# Patient Record
Sex: FEMALE | Race: WHITE | Employment: FULL TIME | URBAN - METROPOLITAN AREA
[De-identification: names, ages, dates, MRNs, and addresses within clinical notes are randomized per-mention and may not be internally consistent; named-entity substitution may affect disease eponyms.]

---

## 2020-07-27 ENCOUNTER — THERAPY VISIT (OUTPATIENT)
Dept: CHIROPRACTIC MEDICINE | Facility: CLINIC | Age: 35
End: 2020-07-27
Payer: COMMERCIAL

## 2020-07-27 DIAGNOSIS — M99.01 CERVICAL SEGMENT DYSFUNCTION: ICD-10-CM

## 2020-07-27 DIAGNOSIS — M99.05 SEGMENTAL DYSFUNCTION OF PELVIC REGION: Primary | ICD-10-CM

## 2020-07-27 DIAGNOSIS — M62.838 SPASM OF MUSCLE: ICD-10-CM

## 2020-07-27 DIAGNOSIS — M54.2 CERVICALGIA: ICD-10-CM

## 2020-07-27 DIAGNOSIS — M99.02 THORACIC SEGMENT DYSFUNCTION: ICD-10-CM

## 2020-07-27 DIAGNOSIS — M99.03 SEGMENTAL DYSFUNCTION OF LUMBAR REGION: ICD-10-CM

## 2020-07-27 DIAGNOSIS — M54.50 LUMBAGO: ICD-10-CM

## 2020-07-27 PROCEDURE — 99203 OFFICE O/P NEW LOW 30 MIN: CPT | Mod: 25 | Performed by: CHIROPRACTOR

## 2020-07-27 PROCEDURE — 98941 CHIROPRACT MANJ 3-4 REGIONS: CPT | Mod: AT | Performed by: CHIROPRACTOR

## 2020-07-27 NOTE — PROGRESS NOTES
Chiropractic Clinic Visit    PCP: No Ref-Primary, Physician    Radha Kam is a 35 year old female who is seen  as a self referral presenting with low back and neck pain . Patient reports that the onset was 6-7 years ago after lifting weights.  Radha was lifiting heavy weaight and over time started to feel some pain/stiffness in her low back and upper trap region on the right.  These symptoms have come and go over the past few years.  She currently rates her symptoms at a 4 out of 10 and describes them as a dull achy pain with periods of sharpness depending on movement/position.  There are no radiating symptoms and her sleep is interrupted at times.     Injury: Overuse    Location of Pain: lower cervical and lower lumabr  at the following level(s) C6 , C7 , T1 , T2 , L4  and L5   Duration of Pain: 6-7  year(s)  Rating of Pain at worst: 7/10  Rating of Pain Currently: 4/10  Symptoms are better with: Rest  Symptoms are worse with: sitting and sleeping  Additional Features:      Other evaluation and/or treatments so far consists of:     Health History  as reported by the patient:    How does the patient rate their own health:   Good    Current or past medical history:   Migraines/headaches    Medical allergies  None    Past Traumas/Surgeries  Other:  [place] teeth extraction    Family History  The family history is not on file.    Medications:  None    Occupation:  teacher    Primary job tasks:   Computer work    Barriers as home/work:   none    Additional health Issues:                   Review of Systems  Musculoskeletal: as above  Remainder of review of systems is negative including constitutional, CV, pulmonary, GI, Skin and Neurologic except as noted in HPI or medical history.    No past medical history on file.  No past surgical history on file.    Objective  There were no vitals taken for this visit.    GENERAL APPEARANCE: healthy, alert and no distress   GAIT: NORMAL  SKIN: no suspicious lesions or  "rashes  NEURO: Normal strength and tone, mentation intact and speech normal  PSYCH:  mentation appears normal and affect normal/bright      Cervical Spine Exam    Range of Motion:         forward flexion mild limitation with pulling         extension moderate limitation         lateral rotation moderate limitation with some pain on right        lateral flexion moderate limitation with some pulling    Inspection:         Anterior head position  Forward rounded shoulders  Flattened thoracic kyphosis    Tender:        upper border of trapezius    Non-Tender:        remainder of cervical spine area    Muscle strength:       C4 (shoulder shrug)  symmetric 5/5 Normal       C5 (shoulder abduction) symmetric 5/5 Normal       C6 (elbow flexion) symmetric 5/5 Normal       C7 (elbow extension) symmetric 5/5 Normal       C8 (finger abduction, thumb flexion) symmetric 5/5 Normal    Sensation:       grossly intact througout bilateral upper extremities    Special Tests:       neg (-) Spurling    Lymphatics:        no edema noted in the upper extremities       Lumbar exam:    Inspection:  \"     no visible deformity in the low back       normal skin\",    ROM:       full flexion       limited extension due to pain    Tender:       paraspinal muscles    Non Tender:       remainder of lumbar spine    Strength:       hip flexion 5/5 Normal       knee extension 5/5 Normal       ankle dorsiflexion 5/5 Normal       ankle plantarflexion 5/5 Normal       dorsiflexion of the great toe 5/5 Normal    Reflexes:       patellar (L3, L4) 2 bilaterally        Sensation:      grossly intact throughout lower extremities    Special tests:  SLR - Right negative and Left negative, Fabere - Right negative and Left negative, Yeoman's - Right negative and Left negative, Nu - Right negative and Left negative and Ely's - Right negative and Left negative    Segmental spinal dysfunction/restrictions found at:  C6 , C7 , T1 , T2 , T7 , T8 , T9 , L4 , L5  and " PSIS Right     The following soft tissue hypotonicities were observed:  Piriformis: right, ache, dull pain and stiff, referred pain: no  Traps: bilateral, ache, dull pain and stiff, referred pain: no    Trigger points were found in:  Piriformis and Traps    Muscle spasm found in:  Piriformis and Traps      Radiology:      Assessment:    1. Segmental dysfunction of pelvic region    2. Lumbago    3. Segmental dysfunction of lumbar region    4. Spasm of muscle    5. Thoracic segment dysfunction    6. Cervicalgia    7. Cervical segment dysfunction        RX ordered/plan of care  Anticipated outcomes  Possible risks and side effects    After discussing the risk and benefits of care, patient consented to treatment    Prognosis: Good      Patient's condition:  Patient had restrictions pre-manipulation    Treatment effectiveness:  Post manipulation there is better intersegmental movement and Patient claims to feel looser post manipulation      Plan:    Procedures:  Evaluation and Management  01590 Moderate level exam 30 min    CMT:  96754 Chiropractic manipulative treatment 3-4 regions performed   Cervical: Diversified, C6, C7 , Prone  Thoracic: Diversified, T1, T2, T7, T8, T9, Prone  Lumbar: Diversified, L4, L5, Side posture  Pelvis: Drop Table, PSIS Right , Prone    Modalities:  33535: MSTM:  To Piriformis and Traps  for 5 min    Therapeutic procedures:  20904: Therapeutic Exercises  Direct one-on-one treatment to develop flexibilty, range of motion, strength and endurance.   The following were demonstrated and practiced:   Stretches - Reviewed stretches today for a minimum of 5 minutes  Crossed leg piriformis stretch seated  Scapular retraction  Cervical retraction    Response to Treatment  Reduction in symptoms as reported by patient    Treatment plan and goals:  Goals:  SLEEPING: the patient specific goal is to attain his pre-injury status of 8 hours comfortably    Frequency of care  Duration of care is estimated to be  6 weeks, from the initial treatment.  It is estimated that the patient will need a total of 6 visits to resolve this episode.  For the initial therapeutic trial of care, the frequency is recommended at 1 X week, once daily.  A reevaluation would be clinically appropriate in 6 visits, to determine progress and further course of care.    In-Office Treatment  Evaluation  Spinal Chiropractic Manipulative Therapy:    Modalities: MSTM  Therapeutic exercises:  Stretches              Recommendations:    Instructions:  stretch as instructed  and ice 20 minutes every other hour as needed    Follow-up:    Return to care in one week       Disclaimer: This note consists of symbols derived from keyboarding, dictation and/or voice recognition software. As a result, there may be errors in the script that have gone undetected. Please consider this when interpreting information found in this chart.

## 2020-08-04 ENCOUNTER — THERAPY VISIT (OUTPATIENT)
Dept: CHIROPRACTIC MEDICINE | Facility: CLINIC | Age: 35
End: 2020-08-04
Payer: COMMERCIAL

## 2020-08-04 DIAGNOSIS — M99.01 CERVICAL SEGMENT DYSFUNCTION: ICD-10-CM

## 2020-08-04 DIAGNOSIS — M99.02 THORACIC SEGMENT DYSFUNCTION: ICD-10-CM

## 2020-08-04 DIAGNOSIS — M62.838 SPASM OF MUSCLE: ICD-10-CM

## 2020-08-04 DIAGNOSIS — M54.50 LUMBAGO: ICD-10-CM

## 2020-08-04 DIAGNOSIS — M54.2 CERVICALGIA: ICD-10-CM

## 2020-08-04 DIAGNOSIS — M99.05 SEGMENTAL DYSFUNCTION OF PELVIC REGION: Primary | ICD-10-CM

## 2020-08-04 DIAGNOSIS — M99.03 SEGMENTAL DYSFUNCTION OF LUMBAR REGION: ICD-10-CM

## 2020-08-04 PROCEDURE — 98941 CHIROPRACT MANJ 3-4 REGIONS: CPT | Mod: AT | Performed by: CHIROPRACTOR

## 2020-08-04 NOTE — PROGRESS NOTES
Visit #:  2 of 6, based on treatment plan    Subjective:  Radha Kam is a 35 year old female who is seen in f/u up for:        Segmental dysfunction of pelvic region  Lumbago  Segmental dysfunction of lumbar region  Spasm of muscle  Thoracic segment dysfunction  Cervicalgia  Cervical segment dysfunction.     Since last visit on 7/27/2020,  Radha Kam reports the following changes: Pain immediately after last treatment: 4/10 and their pain level today 1/10.  Radha reports that she is feeling better and is having less symptoms.  After last visit she went to [facility].  Despite that long drive her back still felt good and gave her minimal pain.    Area of chief complaint:  Cervical, Thoracic and Lumbar :  Symptoms are graded at 1/10. The quality is described as stiff, achey.  Motion has increased, but is still not normal. Patient feels that they are improved due to a reduction in symptoms.        Objective:  The following was observed:    P: palpatory tendernessLumbar erector spine, Piriformis and Traps     A: static palpation demonstrates intersegmental asymmetry , cervical, thoracic, lumbar, pelvis    R: motion palpation notes restricted motion, C6 , C7 , T1 , T2 , T6 , T7 , T8 , L4 , L5  and PSIS Right     T: The following soft tissue hypotonicities were observed:  Piriformis: right, ache, dull pain and stiff, referred pain: no  T paraspinals: bilateral, stiff, no  Traps: bilateral, ache and stiff, referred pain: no      Assessment:    Segmental spinal dysfunction/restrictions found at:  C6   C7   T6  T7  T8  L4  L5  PSIS Right    Diagnoses:      1. Segmental dysfunction of pelvic region    2. Lumbago    3. Segmental dysfunction of lumbar region    4. Spasm of muscle    5. Thoracic segment dysfunction    6. Cervicalgia    7. Cervical segment dysfunction        Patient's condition:  Patient had restrictions pre-manipulation    Treatment effectiveness:  Post manipulation there is better intersegmental movement  and Patient claims to feel looser post manipulation      Procedures:  CMT:  52891 Chiropractic manipulative treatment 3-4 regions performed   Cervical: Diversified, C6, C7 , Prone  Thoracic: Diversified, T1, T2, T6, T7, T8, Prone  Lumbar: Diversified, L4, L5, Side posture  Pelvis: Drop Table, PSIS Right , Prone    Modalities:  29453: MSTM:  To Lumbar erector spine, Piriformis and T-spine paraspinal  for 5 min    Therapeutic procedures:  None      Prognosis: Good    Progress towards Goals: Patient is making progress towards the goal     Response to Treatment:   Reduction in symptoms as reported by patient      Recommendations:    Instructions:  stretch as instructed     Follow-up:   Return to care in one week

## 2022-11-23 ENCOUNTER — LAB REQUISITION (OUTPATIENT)
Dept: LAB | Facility: CLINIC | Age: 37
End: 2022-11-23

## 2022-11-23 DIAGNOSIS — E66.9 OBESITY, UNSPECIFIED: ICD-10-CM

## 2022-11-23 DIAGNOSIS — Z36.89 ENCOUNTER FOR OTHER SPECIFIED ANTENATAL SCREENING: ICD-10-CM

## 2022-11-23 LAB
BASOPHILS # BLD AUTO: 0.1 10E3/UL (ref 0–0.2)
BASOPHILS NFR BLD AUTO: 1 %
EOSINOPHIL # BLD AUTO: 0.2 10E3/UL (ref 0–0.7)
EOSINOPHIL NFR BLD AUTO: 2 %
ERYTHROCYTE [DISTWIDTH] IN BLOOD BY AUTOMATED COUNT: 12.2 % (ref 10–15)
HBA1C MFR BLD: 5.2 %
HCT VFR BLD AUTO: 42.4 % (ref 35–47)
HGB BLD-MCNC: 14 G/DL (ref 11.7–15.7)
IMM GRANULOCYTES # BLD: 0 10E3/UL
IMM GRANULOCYTES NFR BLD: 0 %
LYMPHOCYTES # BLD AUTO: 2.7 10E3/UL (ref 0.8–5.3)
LYMPHOCYTES NFR BLD AUTO: 28 %
MCH RBC QN AUTO: 30.6 PG (ref 26.5–33)
MCHC RBC AUTO-ENTMCNC: 33 G/DL (ref 31.5–36.5)
MCV RBC AUTO: 93 FL (ref 78–100)
MONOCYTES # BLD AUTO: 0.7 10E3/UL (ref 0–1.3)
MONOCYTES NFR BLD AUTO: 7 %
NEUTROPHILS # BLD AUTO: 6 10E3/UL (ref 1.6–8.3)
NEUTROPHILS NFR BLD AUTO: 62 %
NRBC # BLD AUTO: 0 10E3/UL
NRBC BLD AUTO-RTO: 0 /100
PLATELET # BLD AUTO: 292 10E3/UL (ref 150–450)
RBC # BLD AUTO: 4.57 10E6/UL (ref 3.8–5.2)
WBC # BLD AUTO: 9.6 10E3/UL (ref 4–11)

## 2022-11-23 PROCEDURE — 87086 URINE CULTURE/COLONY COUNT: CPT | Performed by: NURSE PRACTITIONER

## 2022-11-23 PROCEDURE — 87591 N.GONORRHOEAE DNA AMP PROB: CPT | Performed by: NURSE PRACTITIONER

## 2022-11-23 PROCEDURE — 86850 RBC ANTIBODY SCREEN: CPT | Performed by: NURSE PRACTITIONER

## 2022-11-23 PROCEDURE — 87340 HEPATITIS B SURFACE AG IA: CPT | Performed by: NURSE PRACTITIONER

## 2022-11-23 PROCEDURE — 87491 CHLMYD TRACH DNA AMP PROBE: CPT | Performed by: NURSE PRACTITIONER

## 2022-11-23 PROCEDURE — 86901 BLOOD TYPING SEROLOGIC RH(D): CPT | Performed by: NURSE PRACTITIONER

## 2022-11-23 PROCEDURE — 83036 HEMOGLOBIN GLYCOSYLATED A1C: CPT | Performed by: NURSE PRACTITIONER

## 2022-11-23 PROCEDURE — 85025 COMPLETE CBC W/AUTO DIFF WBC: CPT | Performed by: NURSE PRACTITIONER

## 2022-11-23 PROCEDURE — 86780 TREPONEMA PALLIDUM: CPT | Performed by: NURSE PRACTITIONER

## 2022-11-23 PROCEDURE — 86762 RUBELLA ANTIBODY: CPT | Performed by: NURSE PRACTITIONER

## 2022-11-23 PROCEDURE — 87389 HIV-1 AG W/HIV-1&-2 AB AG IA: CPT | Performed by: NURSE PRACTITIONER

## 2022-11-23 PROCEDURE — 86803 HEPATITIS C AB TEST: CPT | Performed by: NURSE PRACTITIONER

## 2022-11-24 LAB
ABO/RH(D): NORMAL
ANTIBODY SCREEN: NEGATIVE
C TRACH DNA SPEC QL PROBE+SIG AMP: NEGATIVE
N GONORRHOEA DNA SPEC QL NAA+PROBE: NEGATIVE
SPECIMEN EXPIRATION DATE: NORMAL

## 2022-11-25 LAB
BACTERIA UR CULT: NO GROWTH
HBV SURFACE AG SERPL QL IA: NONREACTIVE
HCV AB SERPL QL IA: NONREACTIVE
HIV 1+2 AB+HIV1 P24 AG SERPL QL IA: NONREACTIVE
RUBV IGG SERPL QL IA: 4.69 INDEX
RUBV IGG SERPL QL IA: POSITIVE
T PALLIDUM AB SER QL: NONREACTIVE

## 2023-01-17 ENCOUNTER — LAB REQUISITION (OUTPATIENT)
Dept: LAB | Facility: CLINIC | Age: 38
End: 2023-01-17

## 2023-01-17 ENCOUNTER — TRANSFERRED RECORDS (OUTPATIENT)
Dept: HEALTH INFORMATION MANAGEMENT | Facility: CLINIC | Age: 38
End: 2023-01-17
Payer: COMMERCIAL

## 2023-01-17 ENCOUNTER — MEDICAL CORRESPONDENCE (OUTPATIENT)
Dept: HEALTH INFORMATION MANAGEMENT | Facility: CLINIC | Age: 38
End: 2023-01-17
Payer: COMMERCIAL

## 2023-01-17 DIAGNOSIS — Z36.87 ENCOUNTER FOR ANTENATAL SCREENING FOR UNCERTAIN DATES: ICD-10-CM

## 2023-01-17 PROCEDURE — 82105 ALPHA-FETOPROTEIN SERUM: CPT | Performed by: ADVANCED PRACTICE MIDWIFE

## 2023-01-18 ENCOUNTER — TRANSCRIBE ORDERS (OUTPATIENT)
Dept: MATERNAL FETAL MEDICINE | Facility: CLINIC | Age: 38
End: 2023-01-18
Payer: COMMERCIAL

## 2023-01-18 DIAGNOSIS — O26.90 PREGNANCY RELATED CONDITION, ANTEPARTUM: Primary | ICD-10-CM

## 2023-01-20 LAB
# FETUSES US: NORMAL
AFP MOM SERPL: 1.26
AFP SERPL-MCNC: 38 NG/ML
AGE - REPORTED: 38.5 YR
CURRENT SMOKER: NO
FAMILY MEMBER DISEASES HX: NO
GA METHOD: NORMAL
GA: NORMAL WK
IDDM PATIENT QL: NO
INTEGRATED SCN PATIENT-IMP: NORMAL
SPECIMEN DRAWN SERPL: NORMAL

## 2023-02-06 ENCOUNTER — PRE VISIT (OUTPATIENT)
Dept: MATERNAL FETAL MEDICINE | Facility: CLINIC | Age: 38
End: 2023-02-06
Payer: COMMERCIAL

## 2023-02-10 ENCOUNTER — OFFICE VISIT (OUTPATIENT)
Dept: MATERNAL FETAL MEDICINE | Facility: CLINIC | Age: 38
End: 2023-02-10
Attending: OBSTETRICS & GYNECOLOGY
Payer: COMMERCIAL

## 2023-02-10 ENCOUNTER — HOSPITAL ENCOUNTER (OUTPATIENT)
Dept: ULTRASOUND IMAGING | Facility: CLINIC | Age: 38
Discharge: HOME | End: 2023-02-10
Attending: OBSTETRICS & GYNECOLOGY
Payer: COMMERCIAL

## 2023-02-10 DIAGNOSIS — O09.522 MULTIGRAVIDA OF ADVANCED MATERNAL AGE IN SECOND TRIMESTER: Primary | ICD-10-CM

## 2023-02-10 DIAGNOSIS — Z83.49 FAMILY HISTORY OF PHENYLKETONURIA: ICD-10-CM

## 2023-02-10 DIAGNOSIS — O09.522 AMA (ADVANCED MATERNAL AGE) MULTIGRAVIDA 35+, SECOND TRIMESTER: Primary | ICD-10-CM

## 2023-02-10 DIAGNOSIS — O26.90 PREGNANCY RELATED CONDITION, ANTEPARTUM: ICD-10-CM

## 2023-02-10 PROCEDURE — 76811 OB US DETAILED SNGL FETUS: CPT | Mod: 26 | Performed by: OBSTETRICS & GYNECOLOGY

## 2023-02-10 PROCEDURE — 76811 OB US DETAILED SNGL FETUS: CPT

## 2023-02-10 PROCEDURE — 96040 HC GENETIC COUNSELING, EACH 30 MINUTES: CPT | Performed by: GENETIC COUNSELOR, MS

## 2023-02-10 NOTE — PROGRESS NOTES
[facility]  Genetic Counseling Consult    Patient:  Radha Kam YOB: 1985   Date of Service:  2/10/23   MRN: [id number]    Radha was seen at the [facility] for genetic consultation. The indication for genetic counseling is advanced maternal age. The patient was accompanied to this visit by their partner, Luis E. The patient is wearing a mask due to current [facility] policies.     The session was conducted in English.      IMPRESSION/ PLAN   1. Radha had genetic screening earlier in this pregnancy. Their non-invasive prenatal test was screen negative or low risk for screened conditions . Predicted fetal sex is female.     2. During today's [facility] visit, Radha had a genetic counseling session only. Screening and diagnostic testing was discussed and declined.  and a genetic counseling session only. Radha has already had genetic testing in this pregnancy. Additional screening and diagnostic testing was discussed for the gestational age and declined.    3. Radha had a level II comprehensive anatomy ultrasound today. Please see the ultrasound report for further details.    4. No further [facility] recommendations.    PREGNANCY HISTORY   /Parity:       Radha's pregnancy history is significant for:      full-term delivery, male. Born with nuchal cord x2 and a true knot in the placenta. Developmentally on track, though speech may be slightly delay. Otherwise doing well.     CURRENT PREGNANCY   Current Age: 38 year old   Age at Delivery: 38 year old  LUIS ANTONIO: 2023, by Last Menstrual Period                                   Gestational Age: 19w6d  This pregnancy is a single gestation.   This pregnancy was conceived spontaneously.    MEDICAL HISTORY   Radha s reported medical history is not expected to impact pregnancy management or risks to fetal development.       FAMILY HISTORY   A three-generation pedigree was  "obtained today and is scanned under the \"Media\" tab in Epic. The family history was reported by Radha and their partner.    The following significant findings were reported today:     Radha has two full-brothers and one maternal half-brother. One of her full-brothers has a clotting disorder (clots too much), but reportedly underwent a genetic evaluation that was negative.   Radha's maternal half-brother has a son who has phenylketonuria (PKU), also known as phenylalanine hydroxylase deficiency. His other son is unaffected. This was previously discussed with the genetic counselor at [facility]. Given the degree of relationship, Radha has a 1 in 4 chance to be a carrier of this condition. Phenylalanine hydroxylase deficiency (PAH deficiency) is a genetic condition that impacts the body's ability to break down an amino acid called phenylalanine. This amino acid is present in most proteins and in some artifical sweeteners. This condition can range in severity, the most severe form of which is called classic phenylketonuria (PKU). PKU can cause intellectual disability, seizures, and other health problems if untreated. PKU is an autosomal recessive condition caused by bialellic mutations in PAH. If two individuals are carriers of this condition, each of their pregnancies would have a 1 in 4 or 25% chance to be affected. This condition is on  Screening and is mostly treated through diet and some medication therapy. The worldwide carrier frequency for PAH deficiency is 1 in 58. Thus, the current reproductive risk is up to 1 in 928.     Radha's mother, one of her maternal aunts, and her maternal grandmother all have \"lung fibrosis\". This has not caused them significant concerns, though they are monitored for worsening symptoms. Radha's family members do not have cystic fibrosis to her knowledge. Of note, her maternal uncle also had the condition and is  due to complications of the condition/poor management " of the condition. This condition appears to present in an autosomal dominant manner, so it is possible that Radha could have a 50% chance to also have the condition. Unfortunately, without further details, a more specific risk assessment cannot be provided.       Radha's partner, Luis E, has two siblings who are healthy. His sister has two children, both alive and well.     Otherwise, the reported family history is unremarkable for multiple miscarriages, stillbirths, birth defects, intellectual disabilities, known genetic conditions, and consanguinity.       CARRIER SCREENING   Expanded carrier screening is available to screen for autosomal recessive conditions and X-linked conditions in a large list of genes. Autosomal recessive conditions happen when a mutation has been inherited from the egg and sperm and include conditions like cystic fibrosis, thalassemia, hearing loss, spinal muscular atrophy, and more. X-linked conditions happen when a mutation has been inherited from the egg and include conditions like fragile X syndrome.  screening was also reviewed. About [place]  Screening    Given the family history of PKU, we again reviewed the option of proceeding with carrier screening. This would be available to target only PKU or as part of a risk assessment for a larger number of conditions. The couple declined carrier screening.        RISK ASSESSMENT FOR CHROMOSOME CONDITIONS   We explained that the risk for fetal chromosome abnormalities increases with maternal age. We discussed specific features of common chromosome abnormalities, including Down syndrome, trisomy 13, trisomy 18, and sex chromosome trisomies.      At age 38 at midtrimester, the risk to have a baby with Down syndrome is 1 in 129.     At age 38 at midtrimester, the risk to have a baby with any chromosome abnormality is 1 in 65.     Radha had genetic screening earlier in this pregnancy. Their non-invasive prenatal test was screen negative or  low risk for screened conditions     Non-invasive prenatal testing (NIPT) results    Maternal plasma cell-free DNA testing    Screens for fetal trisomy 21, trisomy 13, trisomy 18, and sex chromosome aneuploidy    First trimester ultrasound with nuchal translucency and nasal bone assessment was not performed in this pregnancy, to our knowledge.    aRdha had a Panorama test earlier in pregnancy; we reviewed the results today, which are low risk.    The NIPT did include sex chromosome aneuploidies and the result was low risk. The predicted sex is XX, which is typically female.    Given the accuracy of this test, these results greatly decrease the chance for certain fetal chromosome abnormalities    We discussed the limitations of normal NIPT results    Maternal serum AFP only to screen for open neural tube defects (after 15 weeks) was normal per patient report, but the result is not available for our review.     GENETIC TESTING OPTIONS   Genetic testing during a pregnancy includes screening and diagnostic procedures.      Screening tests are non-invasive which means no risk to the pregnancy and includes ultrasounds and blood work. The benefits and limitations of screening were reviewed. Screening tests provide a risk assessment (chance) specific to the pregnancy for certain fetal chromosome abnormalities but cannot definitively diagnose or exclude a fetal chromosome abnormality. Follow-up genetic counseling and consideration of diagnostic testing is recommended with any abnormal screening result. Diagnostic testing during a pregnancy is more certain and can test for more conditions. However, the tests do have a risk of miscarriage that requires careful consideration. These tests can detect fetal chromosome abnormalities with greater than 99% certainty. Results can be compromised by maternal cell contamination or mosaicism and are limited by the resolution of current genetic testing technology.     There is no screening  or diagnostic test that detects all forms of birth defects or intellectual disability.     We discussed the following ultrasound options:  Comprehensive level II ultrasound (Fetal Anatomy Ultrasound)    Ultrasound done between 18-20 weeks gestation    Screens for major birth defects and markers for aneuploidy (like trisomy 21 and trisomy 18)    Includes assessment of the fetal growth, internal organs, placenta, and amniotic fluid      We discussed the following diagnostic options:   Amniocentesis    Invasive diagnostic procedure done after 15 weeks gestation    The procedure collects a small sample of amniotic fluid for the purpose of chromosomal testing and/or other genetic testing    Diagnostic result; more than 99% sensitivity for fetal chromosome abnormalities    Testing for AFP in the amniotic fluid can test for open neural tube defects        It was a pleasure to be involved with Radha [facility]. Face-to-face time of the meeting was 30 minutes.    Althea Milner GC, MS, LGC  Certified and [place] Licensed Genetic Counselor  [facility]  Office: [phone]  [facility]: [phone]   Fax: [phone]  [facility]

## 2023-02-10 NOTE — PROGRESS NOTES
"Please see \"Imaging\" tab under \"Chart Review\" for details of today's visit.    Danial Toledo    "

## 2023-03-10 ENCOUNTER — OFFICE VISIT (OUTPATIENT)
Dept: MATERNAL FETAL MEDICINE | Facility: CLINIC | Age: 38
End: 2023-03-10
Attending: OBSTETRICS & GYNECOLOGY
Payer: COMMERCIAL

## 2023-03-10 ENCOUNTER — HOSPITAL ENCOUNTER (OUTPATIENT)
Dept: ULTRASOUND IMAGING | Facility: CLINIC | Age: 38
Discharge: HOME | End: 2023-03-10
Attending: OBSTETRICS & GYNECOLOGY
Payer: COMMERCIAL

## 2023-03-10 DIAGNOSIS — Z03.73 SUSPECTED FETAL ANOMALY NOT FOUND: Primary | ICD-10-CM

## 2023-03-10 DIAGNOSIS — O09.522 AMA (ADVANCED MATERNAL AGE) MULTIGRAVIDA 35+, SECOND TRIMESTER: ICD-10-CM

## 2023-03-10 PROCEDURE — 76816 OB US FOLLOW-UP PER FETUS: CPT

## 2023-03-10 PROCEDURE — 76816 OB US FOLLOW-UP PER FETUS: CPT | Mod: 26 | Performed by: OBSTETRICS & GYNECOLOGY

## 2023-03-10 NOTE — NURSING NOTE
Patient reports positive fetal movement, no pain, no contractions, leaking of fluid, or bleeding.   SBAR given to ROSA MARIA ASHTON, see their note in Epic.

## 2023-03-10 NOTE — PROGRESS NOTES
Please see the imaging tab for details of the ultrasound performed today.    Pati Oropeza MD  Specialist in Maternal-Fetal Medicine

## 2023-06-05 ENCOUNTER — LAB REQUISITION (OUTPATIENT)
Dept: LAB | Facility: CLINIC | Age: 38
End: 2023-06-05
Payer: COMMERCIAL

## 2023-06-05 DIAGNOSIS — Z36.9 ENCOUNTER FOR ANTENATAL SCREENING, UNSPECIFIED: ICD-10-CM

## 2023-06-05 PROCEDURE — 87653 STREP B DNA AMP PROBE: CPT | Mod: ORL | Performed by: PHYSICIAN ASSISTANT

## 2023-06-06 LAB — GP B STREP DNA SPEC QL NAA+PROBE: NEGATIVE

## 2023-08-21 ENCOUNTER — LAB REQUISITION (OUTPATIENT)
Dept: LAB | Facility: CLINIC | Age: 38
End: 2023-08-21

## 2023-08-21 DIAGNOSIS — Z13.220 ENCOUNTER FOR SCREENING FOR LIPOID DISORDERS: ICD-10-CM

## 2023-08-21 DIAGNOSIS — Z13.29 ENCOUNTER FOR SCREENING FOR OTHER SUSPECTED ENDOCRINE DISORDER: ICD-10-CM

## 2023-08-21 DIAGNOSIS — Z12.4 ENCOUNTER FOR SCREENING FOR MALIGNANT NEOPLASM OF CERVIX: ICD-10-CM

## 2023-08-21 LAB
CHOLEST SERPL-MCNC: 273 MG/DL
HDLC SERPL-MCNC: 91 MG/DL
LDLC SERPL CALC-MCNC: 163 MG/DL
NONHDLC SERPL-MCNC: 182 MG/DL
TRIGL SERPL-MCNC: 93 MG/DL
TSH SERPL DL<=0.005 MIU/L-ACNC: 2.29 UIU/ML (ref 0.3–4.2)

## 2023-08-21 PROCEDURE — 84443 ASSAY THYROID STIM HORMONE: CPT | Performed by: OBSTETRICS & GYNECOLOGY

## 2023-08-21 PROCEDURE — 80061 LIPID PANEL: CPT | Performed by: OBSTETRICS & GYNECOLOGY

## 2023-08-21 PROCEDURE — 87624 HPV HI-RISK TYP POOLED RSLT: CPT | Performed by: OBSTETRICS & GYNECOLOGY

## 2023-08-21 PROCEDURE — G0145 SCR C/V CYTO,THINLAYER,RESCR: HCPCS | Performed by: OBSTETRICS & GYNECOLOGY

## 2023-08-23 LAB
BKR LAB AP GYN ADEQUACY: NORMAL
BKR LAB AP GYN INTERPRETATION: NORMAL
BKR LAB AP HPV REFLEX: NORMAL
BKR LAB AP LMP: NORMAL
BKR LAB AP PREVIOUS ABNL DX: NORMAL
BKR LAB AP PREVIOUS ABNORMAL: NORMAL
PATH REPORT.COMMENTS IMP SPEC: NORMAL
PATH REPORT.COMMENTS IMP SPEC: NORMAL
PATH REPORT.RELEVANT HX SPEC: NORMAL

## 2023-08-24 LAB
HUMAN PAPILLOMA VIRUS 16 DNA: NEGATIVE
HUMAN PAPILLOMA VIRUS 18 DNA: NEGATIVE
HUMAN PAPILLOMA VIRUS FINAL DIAGNOSIS: NORMAL
HUMAN PAPILLOMA VIRUS OTHER HR: NEGATIVE

## 2024-11-15 ENCOUNTER — LAB REQUISITION (OUTPATIENT)
Dept: LAB | Facility: CLINIC | Age: 39
End: 2024-11-15

## 2024-11-15 LAB
ERYTHROCYTE [DISTWIDTH] IN BLOOD BY AUTOMATED COUNT: 12.3 % (ref 10–15)
EST. AVERAGE GLUCOSE BLD GHB EST-MCNC: 114 MG/DL
HBA1C MFR BLD: 5.6 %
HCT VFR BLD AUTO: 43.1 % (ref 35–47)
HGB BLD-MCNC: 14.1 G/DL (ref 11.7–15.7)
MCH RBC QN AUTO: 30.8 PG (ref 26.5–33)
MCHC RBC AUTO-ENTMCNC: 32.7 G/DL (ref 31.5–36.5)
MCV RBC AUTO: 94 FL (ref 78–100)
PLATELET # BLD AUTO: 301 10E3/UL (ref 150–450)
RBC # BLD AUTO: 4.58 10E6/UL (ref 3.8–5.2)
WBC # BLD AUTO: 8.4 10E3/UL (ref 4–11)

## 2024-11-15 PROCEDURE — 85018 HEMOGLOBIN: CPT | Performed by: OBSTETRICS & GYNECOLOGY

## 2024-11-15 PROCEDURE — 80076 HEPATIC FUNCTION PANEL: CPT | Performed by: OBSTETRICS & GYNECOLOGY

## 2024-11-15 PROCEDURE — 84443 ASSAY THYROID STIM HORMONE: CPT | Performed by: OBSTETRICS & GYNECOLOGY

## 2024-11-15 PROCEDURE — 83036 HEMOGLOBIN GLYCOSYLATED A1C: CPT | Performed by: OBSTETRICS & GYNECOLOGY

## 2024-11-16 LAB
ALBUMIN SERPL BCG-MCNC: 4.5 G/DL (ref 3.5–5.2)
ALP SERPL-CCNC: 71 U/L (ref 40–150)
ALT SERPL W P-5'-P-CCNC: 14 U/L (ref 0–50)
AST SERPL W P-5'-P-CCNC: 21 U/L (ref 0–45)
BILIRUB DIRECT SERPL-MCNC: <0.2 MG/DL (ref 0–0.3)
BILIRUB SERPL-MCNC: 0.3 MG/DL
PROT SERPL-MCNC: 7.7 G/DL (ref 6.4–8.3)
TSH SERPL DL<=0.005 MIU/L-ACNC: 1.23 UIU/ML (ref 0.3–4.2)

## 2024-12-24 ENCOUNTER — LAB REQUISITION (OUTPATIENT)
Dept: LAB | Facility: CLINIC | Age: 39
End: 2024-12-24

## 2024-12-24 DIAGNOSIS — Z13.220 ENCOUNTER FOR SCREENING FOR LIPOID DISORDERS: ICD-10-CM

## 2024-12-24 LAB
CHOLEST SERPL-MCNC: 198 MG/DL
FASTING STATUS PATIENT QL REPORTED: NO
HDLC SERPL-MCNC: 58 MG/DL
LDLC SERPL CALC-MCNC: 126 MG/DL
NONHDLC SERPL-MCNC: 140 MG/DL
TRIGL SERPL-MCNC: 68 MG/DL

## 2024-12-24 PROCEDURE — 82465 ASSAY BLD/SERUM CHOLESTEROL: CPT | Performed by: OBSTETRICS & GYNECOLOGY

## 2024-12-24 PROCEDURE — 83718 ASSAY OF LIPOPROTEIN: CPT | Performed by: OBSTETRICS & GYNECOLOGY

## 2024-12-24 PROCEDURE — 80061 LIPID PANEL: CPT | Performed by: OBSTETRICS & GYNECOLOGY
